# Patient Record
Sex: FEMALE | Race: WHITE | NOT HISPANIC OR LATINO | URBAN - METROPOLITAN AREA
[De-identification: names, ages, dates, MRNs, and addresses within clinical notes are randomized per-mention and may not be internally consistent; named-entity substitution may affect disease eponyms.]

---

## 2017-03-07 ENCOUNTER — EMERGENCY (EMERGENCY)
Facility: HOSPITAL | Age: 72
LOS: 1 days | End: 2017-03-07
Payer: MEDICARE

## 2017-03-07 PROCEDURE — 70450 CT HEAD/BRAIN W/O DYE: CPT | Mod: 26

## 2017-03-07 PROCEDURE — 71010: CPT | Mod: 26

## 2017-03-07 PROCEDURE — 99285 EMERGENCY DEPT VISIT HI MDM: CPT

## 2018-04-01 ENCOUNTER — OUTPATIENT (OUTPATIENT)
Dept: OUTPATIENT SERVICES | Facility: HOSPITAL | Age: 73
LOS: 1 days | End: 2018-04-01
Payer: MEDICAID

## 2018-04-01 PROCEDURE — G9001: CPT

## 2018-04-05 ENCOUNTER — EMERGENCY (EMERGENCY)
Facility: HOSPITAL | Age: 73
LOS: 1 days | End: 2018-04-05
Payer: MEDICARE

## 2018-04-05 PROCEDURE — 70450 CT HEAD/BRAIN W/O DYE: CPT | Mod: 26

## 2018-04-05 PROCEDURE — 99284 EMERGENCY DEPT VISIT MOD MDM: CPT

## 2018-04-05 PROCEDURE — 71046 X-RAY EXAM CHEST 2 VIEWS: CPT | Mod: 26

## 2018-04-10 DIAGNOSIS — R69 ILLNESS, UNSPECIFIED: ICD-10-CM

## 2019-07-15 PROBLEM — Z00.00 ENCOUNTER FOR PREVENTIVE HEALTH EXAMINATION: Status: ACTIVE | Noted: 2019-07-15

## 2019-08-30 ENCOUNTER — APPOINTMENT (OUTPATIENT)
Age: 74
End: 2019-08-30

## 2021-03-22 ENCOUNTER — APPOINTMENT (OUTPATIENT)
Dept: UROGYNECOLOGY | Facility: CLINIC | Age: 76
End: 2021-03-22

## 2021-05-27 ENCOUNTER — APPOINTMENT (OUTPATIENT)
Dept: UROLOGY | Facility: CLINIC | Age: 76
End: 2021-05-27
Payer: MEDICARE

## 2021-05-27 ENCOUNTER — NON-APPOINTMENT (OUTPATIENT)
Age: 76
End: 2021-05-27

## 2021-05-27 VITALS
WEIGHT: 166 LBS | SYSTOLIC BLOOD PRESSURE: 146 MMHG | HEIGHT: 65 IN | TEMPERATURE: 97.6 F | DIASTOLIC BLOOD PRESSURE: 71 MMHG | HEART RATE: 69 BPM | BODY MASS INDEX: 27.66 KG/M2

## 2021-05-27 DIAGNOSIS — Z86.39 PERSONAL HISTORY OF OTHER ENDOCRINE, NUTRITIONAL AND METABOLIC DISEASE: ICD-10-CM

## 2021-05-27 PROCEDURE — 51798 US URINE CAPACITY MEASURE: CPT

## 2021-05-27 PROCEDURE — 99205 OFFICE O/P NEW HI 60 MIN: CPT

## 2021-05-27 RX ORDER — GLIMEPIRIDE 4 MG/1
TABLET ORAL
Refills: 0 | Status: ACTIVE | COMMUNITY

## 2021-05-27 RX ORDER — ENALAPRIL MALEATE 20 MG/1
20 TABLET ORAL
Refills: 0 | Status: ACTIVE | COMMUNITY

## 2021-05-27 RX ORDER — AMLODIPINE BESYLATE 5 MG/1
5 TABLET ORAL
Refills: 0 | Status: ACTIVE | COMMUNITY

## 2021-05-27 RX ORDER — SIMVASTATIN 40 MG/1
40 TABLET, FILM COATED ORAL
Refills: 0 | Status: ACTIVE | COMMUNITY

## 2021-05-27 RX ORDER — METFORMIN HYDROCHLORIDE 1000 MG/1
1000 TABLET, COATED ORAL
Refills: 0 | Status: ACTIVE | COMMUNITY

## 2021-05-27 NOTE — HISTORY OF PRESENT ILLNESS
[FreeTextEntry1] : Pt comes in feeling she may have a UTI. Pt having frequent UTI. Pt also having frequent urination. Pt deneis vaginal bulging or dryness. Pt getting UTI's almost 1x per month. \par \par PVR 31

## 2021-05-27 NOTE — PHYSICAL EXAM
[General Appearance - Well Developed] : well developed [General Appearance - Well Nourished] : well nourished [Normal Appearance] : normal appearance [Well Groomed] : well groomed [General Appearance - In No Acute Distress] : no acute distress [Edema] : no peripheral edema [Respiration, Rhythm And Depth] : normal respiratory rhythm and effort [Exaggerated Use Of Accessory Muscles For Inspiration] : no accessory muscle use [Abdomen Soft] : soft [Abdomen Tenderness] : non-tender [Costovertebral Angle Tenderness] : no ~M costovertebral angle tenderness [FreeTextEntry1] : grade 3 cystocele [Normal Station and Gait] : the gait and station were normal for the patient's age [] : no rash [No Focal Deficits] : no focal deficits [Oriented To Time, Place, And Person] : oriented to person, place, and time [Affect] : the affect was normal [Not Anxious] : not anxious [Mood] : the mood was normal

## 2021-05-27 NOTE — LETTER BODY
[Dear  ___] : Dear  [unfilled], [Consult Letter:] : I had the pleasure of evaluating your patient, [unfilled]. [Please see my note below.] : Please see my note below. [Consult Closing:] : Thank you very much for allowing me to participate in the care of this patient.  If you have any questions, please do not hesitate to contact me. [Sincerely,] : Sincerely, [FreeTextEntry3] : Mike Hargrove, DO\par Genitourinary Medicine\par

## 2021-05-28 LAB
BILIRUB UR QL STRIP: NEGATIVE
CLARITY UR: CLEAR
COLLECTION METHOD: NORMAL
GLUCOSE UR-MCNC: NEGATIVE
HCG UR QL: 0.2 EU/DL
HGB UR QL STRIP.AUTO: NORMAL
KETONES UR-MCNC: NEGATIVE
LEUKOCYTE ESTERASE UR QL STRIP: NORMAL
NITRITE UR QL STRIP: NEGATIVE
PH UR STRIP: 5
PROT UR STRIP-MCNC: NEGATIVE
SP GR UR STRIP: 1.02

## 2021-06-23 ENCOUNTER — APPOINTMENT (OUTPATIENT)
Dept: UROGYNECOLOGY | Facility: CLINIC | Age: 76
End: 2021-06-23
Payer: MEDICARE

## 2021-06-23 VITALS — HEIGHT: 65 IN | WEIGHT: 166 LBS | BODY MASS INDEX: 27.66 KG/M2

## 2021-06-23 DIAGNOSIS — M53.9 DORSOPATHY, UNSPECIFIED: ICD-10-CM

## 2021-06-23 DIAGNOSIS — Z87.01 PERSONAL HISTORY OF PNEUMONIA (RECURRENT): ICD-10-CM

## 2021-06-23 DIAGNOSIS — Z87.19 PERSONAL HISTORY OF OTHER DISEASES OF THE DIGESTIVE SYSTEM: ICD-10-CM

## 2021-06-23 DIAGNOSIS — Z87.828 PERSONAL HISTORY OF OTHER (HEALED) PHYSICAL INJURY AND TRAUMA: ICD-10-CM

## 2021-06-23 DIAGNOSIS — Z83.3 FAMILY HISTORY OF DIABETES MELLITUS: ICD-10-CM

## 2021-06-23 DIAGNOSIS — E78.00 PURE HYPERCHOLESTEROLEMIA, UNSPECIFIED: ICD-10-CM

## 2021-06-23 DIAGNOSIS — Z86.59 PERSONAL HISTORY OF OTHER MENTAL AND BEHAVIORAL DISORDERS: ICD-10-CM

## 2021-06-23 DIAGNOSIS — Z87.09 PERSONAL HISTORY OF OTHER DISEASES OF THE RESPIRATORY SYSTEM: ICD-10-CM

## 2021-06-23 DIAGNOSIS — Z78.9 OTHER SPECIFIED HEALTH STATUS: ICD-10-CM

## 2021-06-23 DIAGNOSIS — Z82.49 FAMILY HISTORY OF ISCHEMIC HEART DISEASE AND OTHER DISEASES OF THE CIRCULATORY SYSTEM: ICD-10-CM

## 2021-06-23 DIAGNOSIS — I10 ESSENTIAL (PRIMARY) HYPERTENSION: ICD-10-CM

## 2021-06-23 DIAGNOSIS — Z63.4 DISAPPEARANCE AND DEATH OF FAMILY MEMBER: ICD-10-CM

## 2021-06-23 LAB
BILIRUB UR QL STRIP: NEGATIVE
CLARITY UR: CLEAR
COLLECTION METHOD: NORMAL
GLUCOSE UR-MCNC: 100
HCG UR QL: 0.2 EU/DL
HGB UR QL STRIP.AUTO: NEGATIVE
KETONES UR-MCNC: NEGATIVE
LEUKOCYTE ESTERASE UR QL STRIP: NEGATIVE
NITRITE UR QL STRIP: NEGATIVE
PH UR STRIP: 5.5
PROT UR STRIP-MCNC: NEGATIVE
SP GR UR STRIP: 1.02

## 2021-06-23 PROCEDURE — 99204 OFFICE O/P NEW MOD 45 MIN: CPT | Mod: 25

## 2021-06-23 PROCEDURE — 81003 URINALYSIS AUTO W/O SCOPE: CPT | Mod: QW

## 2021-06-23 PROCEDURE — 51701 INSERT BLADDER CATHETER: CPT

## 2021-06-23 RX ORDER — CITALOPRAM HYDROBROMIDE 10 MG/1
TABLET, FILM COATED ORAL
Refills: 0 | Status: ACTIVE | COMMUNITY

## 2021-06-23 SDOH — SOCIAL STABILITY - SOCIAL INSECURITY: DISSAPEARANCE AND DEATH OF FAMILY MEMBER: Z63.4

## 2021-06-23 NOTE — HISTORY OF PRESENT ILLNESS
[FreeTextEntry1] : Bothersome leakage of urine for about 5 years. Leakage with urgency and freq more than with cough sneeze which has been small drops and rare since after having children. No gross hematuria, no incomplete bladder emptying subjectively, no flank pain. Pads changed daily, 24 hours 10v3L with changes. Nocturia 6. No intervention as of yet. No bulge or pressure in vagina, no VB, no pain, not SA. Was told has cystocele and may need surgery. Reports hemorrhoid hx s/p banding which she may need again soon per CRS. No rectal bleeding. INtermittent loose or hard stool diet-dependent, but mostly normal caliber. Reports some incomplete evacuation however poor historian when it comes to stool symptoms, rare FI if loose vs normally formed stool. No interevntion for these issues as of yet. No hx stroke, MI, estrog-dep cancer or VTE. Also reports 1 year of recurrent UTIs tx with abx per symptoms. Usually urine not checked. May have had 1-2 +UCxs, but also 2-3 neg ones. Symptom is freq, but no dysuria. Not related to SA bec not active.

## 2021-06-23 NOTE — REASON FOR VISIT
[Questionnaire Received] : Patient questionnaire received [Urinary Incontinence] : urinary incontinence [Urine Frequency] : urine frequency [Urinary Urgency] : urinary urgency [Nocturia] : nocturia [Pelvic Organ Prolapse] : pelvic organ prolapse [Recurrent Urinary Infections] : recurrent urinary infections [Pelvic Pain] : pelvic pain [Problems With Defecation] : problems with defecation

## 2021-06-23 NOTE — PHYSICAL EXAM
[Chaperone Present] : A chaperone was present in the examining room during all aspects of the physical examination [No Acute Distress] : in no acute distress [Oriented x3] : oriented to person, place, and time [No Edema] : ~T edema was not present [Normal Appearance] : ~T the appearance of the neck was normal [None] : no CVA tenderness [Warm and Dry] : was warm and dry to touch [Normal Gait] : gait was normal [Labia Majora] : were normal [Labia Minora] : were normal [Bartholin's Gland] : both Bartholin's glands were normal  [Atrophy] : atrophy [No Bleeding] : there was no active vaginal bleeding [2] : 2 [Aa ____] : Aa [unfilled] [Ba ____] : Ba [unfilled] [C ____] : C [unfilled] [GH ____] : GH [unfilled] [PB ____] : PB [unfilled] [TVL ____] : TVL  [unfilled] [Ap ____] : Ap [unfilled] [Bp ____] : Bp [unfilled] [D ____] : D [unfilled] [] : II [Normal] : normal [Soft] :  the cervix was soft [Post Void Residual ____ml] : post void residual was [unfilled] ml [Exam Deferred] : was deferred [Cough] : no cough [Tenderness] : ~T no ~M abdominal tenderness observed [Distended] : not distended [Inguinal LAD] : no adenopathy was noted in the inguinal lymph nodes [FreeTextEntry3] : supine cst neg, no urethral hyperm [FreeTextEntry4] : no mass cyst or lesion [de-identified] : no appreciable mass or tenderness

## 2021-06-23 NOTE — ASSESSMENT
[FreeTextEntry1] : Ginger is a pleasant 77 yo presenting with YOVANI, OAB-wet >> ANNA, atrophic vaginal changes, rectocele, FI. On exam, her empty supine CST was neg, and she did not have urethral hypermobility. Her straight-cath PVR volume was 100 ml and athe dip was neg besides + glucose - she was cousneled to discuss DM control with her PMD, glucose control to help UI symptosm reviewed. On pelvic exam, she had a positive bulbo reflex. There were no appreciable masses, cysts, or lesions. Pelvic floor muscle contraction strength was present but weak. There was no levator or pelvic floor musculature banding, tightness, or tenderness. POPQ exam demonstrated a distal rectocele. \par \par The patient has pelvic organ prolapse. Management options including observation, kegels with or without PT, biofeedback, pessary, and surgery were reviewed. Pessary care was reviewed. Surg options obliterative vs reconstructive, major vs minor, abd / robotic vs vaginal, with or without hysterectomy, with or without graft use discussed. We discussed that the rectocele could have some bearing in FI or incomplete evac type symptoms. But hard or loose stools to the rectal/anal verge would not be improved by lifting a rectocele. She understood and would liekt o observe.\par \par The patient has symptoms consistent with stress urinary incontinence. The etiology of ANNA was discussed. Management options including observation, behavioral modifications, medication, pessary, Impressa insert, periurethral bulking via cystoscopy, and surgery with midurethral sling were reviewed. Other anti-incontinence procedures such as a Lin or fascial sling also reviewed.\par \par The patient has urinary symptoms consistent with overactive bladder. The etiology of OAB was discussed. Management options including observation, behavioral modifications (dietary changes, monitoring fluid intake, bladder training, timed voids, use of pads/protective garments), kegels, PT, medications, PTNS, SNS, and bladder Botox were all reviewed. She would like to try myrb 25 mg. Risks such as allergy, increase in PVR, HA, nasopharyngitis and increase in BP discussed - check BP 1 week after stsasrting and stop if over 160 and/or 100.\par \par We also reviewed atrophic changes and UTIs. UTI diagnosis based on symptoms discussed, as well as +UCxs - provide specimen prn. Eval to r/o path such as stone or cancer mass if prevention does not help. I am unclear if she truly is getting UTIs as she reports some neg UCxs, and symptom of freq only however has OAB symptoms. Prevention with abx, or vag estrogen discussed, R/B to each. She will try vag estrogen. Risks such as breast pain, vag spotting, estrog-dep cancer VTE, MI, stroke discussed. All ques answered.\par \par Plan:\par [] repeat PVR\par [] myrb 25 mg - rto for med followup in 4 weeks - pvr and bp checks\par [] vag estrogen for uti prevention

## 2021-06-23 NOTE — OB HISTORY
[Vaginal ___] : [unfilled] vaginal delivery(s) [Approximately ___ (Month)] : the LMP was approximately [unfilled] month(s) ago [Last Pap Smear ___] : date of last pap smear was on [unfilled] [Abnormal Pap Smear] : normal pap smear [Sexually Active] : is not sexually active [FreeTextEntry1] : largest baby 7 lbs 10 oz

## 2021-06-23 NOTE — PROCEDURE
[Straight Catheterization] : insertion of a straight catheter [Urinary Tract Infection] : a urinary tract infection [Urinary Retention] : urinary retention [Stress Incontinence] : stress incontinence [Urgent Incontinence] : urgent incontinence [Urinary Frequency] : urinary frequency [Patient] : the patient [___ Fr Straight Tip] : a [unfilled] in [place] straight tip catheter [None] : there were no complications with the catheter insertion [Clear] : clear [No Complications] : no complications [Tolerated Well] : the patient tolerated the procedure well [Post procedure instructions and information given] : Post procedure instructions and information were given and reviewed with patient. [1] : 1 [FreeTextEntry1] : cathed to obtain pvr and uncontam specimen

## 2021-07-02 ENCOUNTER — APPOINTMENT (OUTPATIENT)
Dept: OPHTHALMOLOGY | Facility: CLINIC | Age: 76
End: 2021-07-02

## 2021-07-28 ENCOUNTER — APPOINTMENT (OUTPATIENT)
Dept: UROGYNECOLOGY | Facility: CLINIC | Age: 76
End: 2021-07-28

## 2021-10-06 ENCOUNTER — APPOINTMENT (OUTPATIENT)
Dept: UROGYNECOLOGY | Facility: CLINIC | Age: 76
End: 2021-10-06
Payer: MEDICARE

## 2021-10-06 ENCOUNTER — RESULT CHARGE (OUTPATIENT)
Age: 76
End: 2021-10-06

## 2021-10-06 VITALS
BODY MASS INDEX: 27.66 KG/M2 | SYSTOLIC BLOOD PRESSURE: 136 MMHG | DIASTOLIC BLOOD PRESSURE: 75 MMHG | HEIGHT: 65 IN | WEIGHT: 166 LBS

## 2021-10-06 DIAGNOSIS — R10.2 PELVIC AND PERINEAL PAIN: ICD-10-CM

## 2021-10-06 LAB
BILIRUB UR QL STRIP: NEGATIVE
CLARITY UR: NORMAL
COLLECTION METHOD: NORMAL
GLUCOSE UR-MCNC: NEGATIVE
HCG UR QL: 0.2 EU/DL
HGB UR QL STRIP.AUTO: NEGATIVE
KETONES UR-MCNC: NEGATIVE
LEUKOCYTE ESTERASE UR QL STRIP: NORMAL
NITRITE UR QL STRIP: POSITIVE
PH UR STRIP: 5.5
PROT UR STRIP-MCNC: NEGATIVE
SP GR UR STRIP: 1.02

## 2021-10-06 PROCEDURE — 99213 OFFICE O/P EST LOW 20 MIN: CPT | Mod: 25

## 2021-10-06 PROCEDURE — 51701 INSERT BLADDER CATHETER: CPT

## 2021-10-06 RX ORDER — ESTRADIOL 0.1 MG/G
0.1 CREAM VAGINAL
Qty: 1 | Refills: 5 | Status: ACTIVE | COMMUNITY
Start: 2021-06-23 | End: 1900-01-01

## 2021-10-06 NOTE — HISTORY OF PRESENT ILLNESS
[FreeTextEntry1] : 77 yo presenting with YOVANI, OAB-wet >> ANNA, atrophic vaginal changes, rectocele, FI. Initial exam  ml and POPQ exam demonstrated a distal rectocele - she decided to observe. She was Rx'd myrb 25 mg and topical vaginal estrogen however didn't know/didn't  or start any. Still bothered with unchanged symptoms, no changes.

## 2021-10-06 NOTE — PHYSICAL EXAM
[Chaperone Present] : A chaperone was present in the examining room during all aspects of the physical examination [No Acute Distress] : in no acute distress [Oriented x3] : oriented to person, place, and time [Tenderness] : ~T no ~M abdominal tenderness observed [Distended] : not distended [None] : no CVA tenderness [] : II [Post Void Residual ____ml] : post void residual was [unfilled] ml

## 2021-10-06 NOTE — ASSESSMENT
[FreeTextEntry1] : UTIs and OAB. Myrb and vag estrogen - R/B/A reviewed, SEs. She will check a BP 1 week after starting the bladder med and DC if over 160 and/or 100. RTO for med followup in 4-6 weeks. How to use the meds reviewed. All ques answered.\par \par Plan:\par [] RTO for PVR and BP checks med f/u\par [] myrb 25 mg\par [] vag estrogen for uti prevention \par [] f/u UCx\par

## 2021-10-11 LAB — BACTERIA UR CULT: ABNORMAL

## 2021-10-11 RX ORDER — SULFAMETHOXAZOLE AND TRIMETHOPRIM 800; 160 MG/1; MG/1
800-160 TABLET ORAL TWICE DAILY
Qty: 6 | Refills: 0 | Status: ACTIVE | COMMUNITY
Start: 2021-10-11 | End: 1900-01-01

## 2021-11-10 ENCOUNTER — APPOINTMENT (OUTPATIENT)
Dept: UROGYNECOLOGY | Facility: CLINIC | Age: 76
End: 2021-11-10

## 2021-11-12 ENCOUNTER — APPOINTMENT (OUTPATIENT)
Dept: OPHTHALMOLOGY | Facility: CLINIC | Age: 76
End: 2021-11-12

## 2021-12-07 ENCOUNTER — APPOINTMENT (OUTPATIENT)
Dept: UROGYNECOLOGY | Facility: CLINIC | Age: 76
End: 2021-12-07

## 2022-01-11 ENCOUNTER — APPOINTMENT (OUTPATIENT)
Dept: UROGYNECOLOGY | Facility: CLINIC | Age: 77
End: 2022-01-11
Payer: MEDICARE

## 2022-01-11 VITALS — DIASTOLIC BLOOD PRESSURE: 80 MMHG | HEART RATE: 80 BPM | SYSTOLIC BLOOD PRESSURE: 128 MMHG

## 2022-01-11 DIAGNOSIS — R32 UNSPECIFIED URINARY INCONTINENCE: ICD-10-CM

## 2022-01-11 PROCEDURE — 99213 OFFICE O/P EST LOW 20 MIN: CPT

## 2022-01-11 RX ORDER — MIRABEGRON 25 MG/1
25 TABLET, FILM COATED, EXTENDED RELEASE ORAL
Qty: 30 | Refills: 2 | Status: ACTIVE | COMMUNITY
Start: 2021-06-23 | End: 1900-01-01

## 2022-01-11 NOTE — HISTORY OF PRESENT ILLNESS
[FreeTextEntry1] : Pt presents to office with c/o incontinence, frequency and urgency.  This is what she was seen for at initial visit.  She was rx'd myrbetriq at [facility] on 6/23/21.  At f/u on 10/6/21, pt had not started myrbetriq and her symptoms remained unchanged.  She was advised to start on the medication and f/u in 4 weeks.  She presents today with the same symptoms and says the medication didn't work.  When questioned further, pt states she never started the meds "because it won't work".  We discussed that she needs to try the medication before we can say that it doesn't work.  She is c/o UUI and frequency.  Denies dysuria or any other UTI symptoms and denies any frequency above baseline.  Notes some FI as well, which was also present at last visit.  Advised fiber supplement.  She is asking about her bladder being dropped.  Chart reviewed and she had a stage II rectocele noted at initial visit.  We discussed in detail the difference between prolapse and OAB and that the rectocele is not causing her UUI.   She will try the myrbetriq this time.  States she threw it away and needs a new RX.  I advised her to keep a 2 day voiding diary prior to starting the meds and again 2 weeks after being on them.  She will f/u in 4 weeks for BP and PVR check.  \par \par She is uaing vaginal estrogen "sometimes".  We discussed the benefits of consistent use and she will use BIW.  Instructed to call with any questions or concerns and she verbalizes understanding. \par \par [] vaginal estrogen BIW\par [] myrbetriq 25mg\par [] f/u 4 weeks for BP and PVR check\par [] metamucil\par \par I spent 20 minutes with patient.

## 2022-02-09 ENCOUNTER — APPOINTMENT (OUTPATIENT)
Dept: UROGYNECOLOGY | Facility: CLINIC | Age: 77
End: 2022-02-09

## 2022-03-10 ENCOUNTER — APPOINTMENT (OUTPATIENT)
Dept: UROGYNECOLOGY | Facility: CLINIC | Age: 77
End: 2022-03-10
Payer: MEDICARE

## 2022-03-10 ENCOUNTER — RESULT CHARGE (OUTPATIENT)
Age: 77
End: 2022-03-10

## 2022-03-10 VITALS
HEART RATE: 82 BPM | RESPIRATION RATE: 20 BRPM | SYSTOLIC BLOOD PRESSURE: 150 MMHG | DIASTOLIC BLOOD PRESSURE: 76 MMHG | OXYGEN SATURATION: 99 %

## 2022-03-10 DIAGNOSIS — R35.0 FREQUENCY OF MICTURITION: ICD-10-CM

## 2022-03-10 DIAGNOSIS — R35.1 NOCTURIA: ICD-10-CM

## 2022-03-10 DIAGNOSIS — N81.9 FEMALE GENITAL PROLAPSE, UNSPECIFIED: ICD-10-CM

## 2022-03-10 DIAGNOSIS — K59.00 CONSTIPATION, UNSPECIFIED: ICD-10-CM

## 2022-03-10 PROCEDURE — 51702 INSERT TEMP BLADDER CATH: CPT

## 2022-03-10 PROCEDURE — 99213 OFFICE O/P EST LOW 20 MIN: CPT | Mod: 25

## 2022-03-10 RX ORDER — ESTRADIOL 0.1 MG/G
0.1 CREAM VAGINAL
Qty: 30 | Refills: 1 | Status: ACTIVE | COMMUNITY
Start: 2022-03-10 | End: 1900-01-01

## 2022-03-10 NOTE — HISTORY OF PRESENT ILLNESS
[FreeTextEntry1] : 76 y/o female c/o urinary incontinence, urinary frequency, urgency and nocturia states she has a little burning of urine , no foul smelling urine, no fever no hematuria  last documented UTI October 2021 . pt states she was seen in   Dr. Buckner office and treated for uti in February 2022. pt also states she had not started Myrbetriq or  vaginal estrogen as recommended at her last three visits. states she does not have the medication at home. \par

## 2022-03-10 NOTE — PROCEDURE
[Straight Catheterization] : insertion of a straight catheter [Urinary Tract Infection] : a urinary tract infection [Patient] : the patient [Indwelling Catheter] : an indwelling catheter [___ Fr Straight Tip] : a [unfilled] in [place] straight tip catheter [Clear] : clear [No Complications] : no complications [Tolerated Well] : the patient tolerated the procedure well

## 2022-03-10 NOTE — REASON FOR VISIT
[Follow-Up Visit_____] : a follow-up visit for [unfilled] [Urine Frequency] : urine frequency [Nocturia] : nocturia [Urinary Incontinence] : urinary incontinence

## 2022-03-10 NOTE — ASSESSMENT
[FreeTextEntry1] : We discussed taking medication for OAB, using estrogen vaginal cream, she does state she will start using medication time. due to BP will hold off on myrbetriq at this time pt to continue HTN medication and monitor BP \par samples of  Gemtesa provided (3weeks)

## 2022-03-10 NOTE — DISCUSSION/SUMMARY
[FreeTextEntry1] : 78 y/o female with history of overactive bladder , recurrent UTI pt we discussed monitoring BP, taking medication as rx as well as using estrogen cream pt states she understands and agree. \par to call on Monday for culture results

## 2022-03-10 NOTE — PHYSICAL EXAM
[No Acute Distress] : in no acute distress [Well developed] : well developed [Well Nourished] : ~L well nourished [Good Hygeine] : demonstrates good hygeine [Normal] : was normal [Atrophy] : atrophy

## 2022-03-14 LAB
APPEARANCE: CLEAR
BACTERIA UR CULT: NORMAL
BACTERIA: NEGATIVE
BILIRUB UR QL STRIP: NEGATIVE
BILIRUBIN URINE: NEGATIVE
BLOOD URINE: NEGATIVE
CLARITY UR: CLEAR
COLLECTION METHOD: NORMAL
COLOR: YELLOW
GLUCOSE QUALITATIVE U: ABNORMAL
GLUCOSE UR-MCNC: 250
HCG UR QL: 0.2 EU/DL
HGB UR QL STRIP.AUTO: NEGATIVE
HYALINE CASTS: 1 /LPF
KETONES UR-MCNC: NEGATIVE
KETONES URINE: NEGATIVE
LEUKOCYTE ESTERASE UR QL STRIP: NEGATIVE
LEUKOCYTE ESTERASE URINE: NEGATIVE
MICROSCOPIC-UA: NORMAL
NITRITE UR QL STRIP: NEGATIVE
NITRITE URINE: NEGATIVE
PH UR STRIP: 6
PH URINE: 6
PROT UR STRIP-MCNC: NEGATIVE
PROTEIN URINE: NORMAL
RED BLOOD CELLS URINE: 0 /HPF
SP GR UR STRIP: 1.02
SPECIFIC GRAVITY URINE: 1.02
SQUAMOUS EPITHELIAL CELLS: 1 /HPF
UROBILINOGEN URINE: NORMAL
WHITE BLOOD CELLS URINE: 1 /HPF

## 2022-05-31 ENCOUNTER — EMERGENCY (EMERGENCY)
Facility: HOSPITAL | Age: 77
LOS: 1 days | Discharge: HOME | End: 2022-05-31
Admitting: EMERGENCY MEDICINE
Payer: MEDICAID

## 2022-05-31 DIAGNOSIS — I10 ESSENTIAL (PRIMARY) HYPERTENSION: ICD-10-CM

## 2022-05-31 DIAGNOSIS — E11.9 TYPE 2 DIABETES MELLITUS WITHOUT COMPLICATIONS: ICD-10-CM

## 2022-05-31 DIAGNOSIS — R51.9 HEADACHE, UNSPECIFIED: ICD-10-CM

## 2022-05-31 DIAGNOSIS — R42 DIZZINESS AND GIDDINESS: ICD-10-CM

## 2022-05-31 PROCEDURE — 70450 CT HEAD/BRAIN W/O DYE: CPT | Mod: 26,MG

## 2022-05-31 PROCEDURE — 70486 CT MAXILLOFACIAL W/O DYE: CPT | Mod: 26,MA

## 2022-05-31 PROCEDURE — G1004: CPT

## 2022-05-31 PROCEDURE — 99285 EMERGENCY DEPT VISIT HI MDM: CPT

## 2022-05-31 PROCEDURE — 93010 ELECTROCARDIOGRAM REPORT: CPT

## 2023-06-19 ENCOUNTER — APPOINTMENT (OUTPATIENT)
Dept: UROLOGY | Facility: CLINIC | Age: 78
End: 2023-06-19
Payer: MEDICARE

## 2023-06-19 VITALS
SYSTOLIC BLOOD PRESSURE: 140 MMHG | TEMPERATURE: 98.1 F | WEIGHT: 166 LBS | RESPIRATION RATE: 16 BRPM | BODY MASS INDEX: 27.66 KG/M2 | HEIGHT: 65 IN | DIASTOLIC BLOOD PRESSURE: 72 MMHG | OXYGEN SATURATION: 98 % | HEART RATE: 100 BPM

## 2023-06-19 DIAGNOSIS — N39.0 URINARY TRACT INFECTION, SITE NOT SPECIFIED: ICD-10-CM

## 2023-06-19 DIAGNOSIS — N81.11 CYSTOCELE, MIDLINE: ICD-10-CM

## 2023-06-19 PROCEDURE — 99213 OFFICE O/P EST LOW 20 MIN: CPT

## 2023-06-19 NOTE — HISTORY OF PRESENT ILLNESS
[FreeTextEntry1] : Pt comes in follow up recurrent UTI. Pt was also seen in May of 2021 for a cystocele. Pt was told to follow up with Uro/Gyn which she never did. Pt finished cefpodoxime yesterday. Pt unsure if she can give a urine sample today.

## 2023-06-22 ENCOUNTER — APPOINTMENT (OUTPATIENT)
Dept: UROGYNECOLOGY | Facility: CLINIC | Age: 78
End: 2023-06-22
Payer: MEDICARE

## 2023-06-22 DIAGNOSIS — R15.9 FULL INCONTINENCE OF FECES: ICD-10-CM

## 2023-06-22 DIAGNOSIS — N95.2 POSTMENOPAUSAL ATROPHIC VAGINITIS: ICD-10-CM

## 2023-06-22 DIAGNOSIS — R39.15 URGENCY OF URINATION: ICD-10-CM

## 2023-06-22 DIAGNOSIS — N81.6 RECTOCELE: ICD-10-CM

## 2023-06-22 PROCEDURE — 51701 INSERT BLADDER CATHETER: CPT | Mod: 59

## 2023-06-22 PROCEDURE — 99214 OFFICE O/P EST MOD 30 MIN: CPT | Mod: 25

## 2023-06-22 NOTE — DISCUSSION/SUMMARY
[FreeTextEntry1] : The patient and her son were counseled regarding the pathophysiology of the prolapse, urinary incontinence, recurrent urine tract infections and fecal incontinence.  The also counseled regarding the risks, benefits, indications, and alternatives of further evaluations studies, as well as various management options. She was given verbal and written information/education on pelvic floor muscle exercises, avoidance of dietary bladder irritants, and other strategies to improve bladder control. Pharmacologic management of overactive bladder and urinary frequency was discussed. Treatment options for prolapse including expectant management, pessary and surgery were reviewed.  After a detailed discussion, following management plan was outlined:\par 1. UA, culture ordered from cath specimen today. If it returns positive, will treat the infection as indicated. \par 2.  Discussed UTI prophylaxis strategies.  Discussed option of vaginal estrogen versus low-dose antibiotic prophylaxis.  Family prefers antibiotic.  We will make the decision based on today's urine culture.  I also advised the son to obtain the urine cultures done [facility] and fax me the reports.  \par 3. Provided her scripts for urine culture, sterile cups, lab info etc and advised her to get a culture done and call our office if she develops recurrent symptoms\par 4.  We will consider renal imaging and cystoscopy based on positive urine cultures\par 5. Discussed finding of posterior vaginal wall prolapse.  Explained to the son that management of rectocele/enterocele may or may not decrease frequency of urinary tract infections.  Son is interested in surgical management.  Patient would like to think about it.  Advised them to call the office if they would like to proceed with surgical scheduling.  Patient will need to see PCP for perioperative risk assessment and  optimization of chronic medical conditions\par 6.  Discussed control of management options for fecal incontinence including avoidance of complex carbohydrates, caffeine, intake of fiber supplement to improve bowel consistency and regularity.  She is also taking metformin which can affect the consistency of the stool.  \par 7.  Recommend urodynamic testing prior to an surgical management of prolapse\par 8.  Follow-up in 2-month\par

## 2023-06-22 NOTE — ASSESSMENT
[FreeTextEntry1] : Patient is a 78-year-old multipara with dementia presenting with concerns of recurrent urinary tract infections per the family, urinary urgency, urgency incontinence, intermittent fecal incontinence, stage II posterior vaginal wall prolapse.  On exam, she has a negative cough stress test and normal postvoid residual.  Reviewed recent urine culture in our EMR negative

## 2023-06-22 NOTE — PHYSICAL EXAM
[FreeTextEntry1] : General: Not in acute distress, alert and oriented x3.\par Neck: Supple. No lymphadenopathy. \par Abdomen: Soft, nontender, and nondistended. No obvious hepatosplenomegaly. No obvious hernias. \par Pelvic Exam: Normal external female genitalia. Saddle sensory exam S2 to S4 is intact. Perineal reflexes not visualized. Urethra is hypermobile without prolapse, exudates, or lesions. Cough stress test is negative. Post void residual was checked with I/O cath and was 80 cc of clear urine. Pale and mildly atrophic-appearing vaginal epithelium. No vaginal blood or discharge. Cervix is atrophic, flush with vagina.  Bimanual exam reveals a small uterus in normal positioning. No adnexal masses or tenderness. Rectovaginal exam: Large perineal pocket present with stool remnant. enterocele suspected.  No rectal prolapse appreciated.  Decreased resting and active anal sphincter tone\par POPQ: Aa -2, Ba -2, C -9, TVL 9, D-9, GH 4.5, PB 4, Ap 0, Bp 0\par \par

## 2023-06-22 NOTE — HISTORY OF PRESENT ILLNESS
[FreeTextEntry1] : Patient is a 78-year-old para 5(.×5) who is referred by Dr. Hargrove for evaluation and management of recurrent urinary tract infections.  Patient is accompanied by her son Willis who provides most of the history. \par Son reports lower back pain . Son takes her urine to a walk in clinic, gets treated with antibiotics every 2 weeks\par Patient's recent 3 urine cultures from May through 2023 are negative. \par Daytime frequency:  q 2-3 hrs\par Nocturia: 2-3 times \par Urinary urgency: yes\par Leakage of urine with urgency: yes\par Leakage of urine with activity: Denies\par Incontinence pad use:yes\par Sensation of incomplete bladder emptying:\par History of hematuria:  denies\par Daily fluid intake: 1 cup of coffee, water\par Previous treatments: Saw  in 2021.  She was diagnosed with mixed urinary incontinence and was noted to have a stage I anterior vaginal wall prolapse and stage II posterior vaginal wall prolapse at that time.  SHe was given a prescription for Myrbetriq 25 mg and vaginal estrogen for UTI prevention. Patient last saw Dr. Celaya in 2021.  Urine culture from 2021 grew E. coli greater than 100,000 CFU per mL.  She was treated with Bactrim.  Urine culture from 2022 was negative\par \par Bowel symptoms: Reports chronic constipation. Son also reports incontinence to feces.           Normal colonoscopy in \par \par GYN history: Patient denies history of postmenopausal bleeding.  She denies history of abnormal Pap smears or abnormal mammograms.\par \par Past medical history: Early onset dementia, anxiety, diabetes, hyperlipidemia, hypertension, GERD, constipation, ex-smoker\par \par Past surgical history: Tubal ligation

## 2023-06-22 NOTE — REVIEW OF SYSTEMS
[All Other ROS] : all other reviewed systems are negative
prior suicide attempts via drug overdose, erratic driving. recent interrupted attempt holding gun to head was stopped by friend

## 2023-06-23 LAB
APPEARANCE: CLEAR
BACTERIA: NEGATIVE /HPF
BILIRUBIN URINE: NEGATIVE
BLOOD URINE: NEGATIVE
CAST: 1 /LPF
COLOR: YELLOW
EPITHELIAL CELLS: 4 /HPF
GLUCOSE QUALITATIVE U: NEGATIVE MG/DL
KETONES URINE: NEGATIVE MG/DL
LEUKOCYTE ESTERASE URINE: NEGATIVE
MICROSCOPIC-UA: NORMAL
NITRITE URINE: NEGATIVE
PH URINE: 7
PROTEIN URINE: 30 MG/DL
RED BLOOD CELLS URINE: 0 /HPF
REVIEW: NORMAL
SPECIFIC GRAVITY URINE: 1.02
UROBILINOGEN URINE: 0.2 MG/DL
WHITE BLOOD CELLS URINE: 0 /HPF

## 2023-06-26 LAB — BACTERIA UR CULT: NORMAL

## 2023-07-14 ENCOUNTER — APPOINTMENT (OUTPATIENT)
Dept: UROGYNECOLOGY | Facility: CLINIC | Age: 78
End: 2023-07-14

## 2023-08-04 ENCOUNTER — APPOINTMENT (OUTPATIENT)
Dept: UROGYNECOLOGY | Facility: CLINIC | Age: 78
End: 2023-08-04

## 2023-08-11 ENCOUNTER — APPOINTMENT (OUTPATIENT)
Dept: UROGYNECOLOGY | Facility: CLINIC | Age: 78
End: 2023-08-11

## 2023-10-09 ENCOUNTER — APPOINTMENT (OUTPATIENT)
Dept: UROGYNECOLOGY | Facility: CLINIC | Age: 78
End: 2023-10-09

## 2023-10-26 ENCOUNTER — APPOINTMENT (OUTPATIENT)
Dept: UROGYNECOLOGY | Facility: CLINIC | Age: 78
End: 2023-10-26

## 2023-11-02 ENCOUNTER — APPOINTMENT (OUTPATIENT)
Dept: UROGYNECOLOGY | Facility: CLINIC | Age: 78
End: 2023-11-02

## 2023-11-21 ENCOUNTER — APPOINTMENT (OUTPATIENT)
Dept: UROGYNECOLOGY | Facility: HOSPITAL | Age: 78
End: 2023-11-21

## 2023-11-27 ENCOUNTER — APPOINTMENT (OUTPATIENT)
Dept: UROGYNECOLOGY | Facility: CLINIC | Age: 78
End: 2023-11-27